# Patient Record
Sex: FEMALE | Race: WHITE
[De-identification: names, ages, dates, MRNs, and addresses within clinical notes are randomized per-mention and may not be internally consistent; named-entity substitution may affect disease eponyms.]

---

## 2018-02-04 ENCOUNTER — HOSPITAL ENCOUNTER (EMERGENCY)
Dept: HOSPITAL 45 - C.EDB | Age: 23
Discharge: HOME | End: 2018-02-04
Payer: COMMERCIAL

## 2018-02-04 VITALS
BODY MASS INDEX: 25.19 KG/M2 | WEIGHT: 177.91 LBS | BODY MASS INDEX: 25.19 KG/M2 | HEIGHT: 70.51 IN | WEIGHT: 177.91 LBS | HEIGHT: 70.51 IN

## 2018-02-04 VITALS — OXYGEN SATURATION: 96 % | SYSTOLIC BLOOD PRESSURE: 115 MMHG | DIASTOLIC BLOOD PRESSURE: 57 MMHG | HEART RATE: 67 BPM

## 2018-02-04 VITALS — TEMPERATURE: 98.24 F

## 2018-02-04 DIAGNOSIS — Z79.899: ICD-10-CM

## 2018-02-04 DIAGNOSIS — G43.909: ICD-10-CM

## 2018-02-04 DIAGNOSIS — R20.2: ICD-10-CM
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
## 2018-02-04 DIAGNOSIS — S49.92XA: Primary | ICD-10-CM
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
## 2018-02-04 DIAGNOSIS — Z80.9: ICD-10-CM

## 2018-02-04 NOTE — EMERGENCY ROOM VISIT NOTE
History


First contact with patient:  17:48


Chief Complaint:  SHOULDER PAIN


Stated Complaint:  PAIN IN L ROTATOR CUFF,SHOOTING PAIN DOWN TO HAND





History of Present Illness


The patient is a 22 year old female who presents to the Emergency Room with 

complaints of left shoulder pain with tingling radiating down her left arm to 

her hand and fingers.  The patient reports that she slipped on snow and fell 

with her left arm over the rim of a garbage can.  She reports an abrasion to 

the arm pit.  The patient reports that she had a left acromioclavicular 

separation in 2016 that did not require surgical repair.  She did undergo 

physical therapy for a while, and has not had any other significant problems 

with her injury.  The patient reports that she does have mild discomfort 

radiating into the left anterior chest.  She denies any neck pain or 

significant posterior shoulder/back pain.  The patient is right-hand-dominant, 

and rates her discomfort a 4 out of 10.





Review of Systems


10 system review was performed and was negative except for pertinent positives 

and negatives as indicated in history of present illness





Past Medical/Surgical History


Medical Problems:


(1) Concussion


(2) Migraine








Family History





FH: cancer


No pertinent family history





Social History


Smoking Status:  Never Smoker


Alcohol Use:  occasionally


Marital Status:  single


Housing Status:  lives with roommate


Occupation Status:  [employer] student





Current/Historical Medications


Scheduled


Bupropion (Wellbutrin Sr), 150 MG PO DAILY





Scheduled PRN


Tramadol (Ultram), 1-2 TAB PO Q4H PRN for Pain





Allergies


Coded Allergies:  


     Lactose. (Verified  Allergy, Intermediate, GI SYMPTOMS, 2/4/18)


     POLLEN (Verified  Allergy, Intermediate, ITCHY EYES, SNEEZING, RUNNY NOSE, 

CONGESTION, 2/4/18)





Physical Exam


Vital Signs











  Date Time  Temp Pulse Resp B/P (MAP) Pulse Ox O2 Delivery O2 Flow Rate FiO2


 


2/4/18 18:48  67 18 115/57 96   


 


2/4/18 17:44 36.8 68 16 108/65 98 Room Air  











Physical Exam


CONSTITUTIONAL:  Healthy and well nourished.  Alert and oriented X 3 with 

positive affect.


HEENT:  Normocephalic, atraumatic.  Pupils equal, round and reactive.  


NECK:  Full active range of motion without discomfort.


RESPIRATORY:  Clear to auscultation bilaterally with no wheezing, crackles, 

rhonchi or stridor.


CARDIOVASCULAR:  Regular rate and rhythm with no murmurs, rubs or gallops.


MUSCULOSKELETAL: Examination shows an abrasion in the left axilla.  Otherwise 

she has generalized mild discomfort to palpation about the anterior and 

posterior shoulder and acromioclavicular joint.  She is nontender to palpation 

through the trapezius and scapular region.  No focal tenderness through the 

bicipital groove or biceps tendons/musculature.  No focal tenderness through 

the elbow.  Range of motion of the shoulder does cause discomfort.  Distal 

pulses are intact.


INTEGUMENTARY:  No rash or other significant dermatologic conditions noted.


NEUROLOGIC:  No focal neurologic deficits noted.  Left hand and fingers, as 

well as deltoid sensation are intact.





Medical Decision & Procedures


ER Provider


Diagnostic Interpretation:


My interpretation of left shoulder x-rays does not show any acute fractures or 

dislocation.  Radiologist report is as follows:





L SHOULDER MIN 2 VIEWS ROUTINE





CLINICAL HISTORY: 22 years-old Female presenting with L shoulder pain s/p fall. 





TECHNIQUE: Internal rotation, external rotation, Grashey views of the left


shoulder were obtained. 





COMPARISON: None.





FINDINGS:


Glenohumeral and acromioclavicular joints congruent. No degenerative change. No


acute fracture or malalignment. No subluxation of the humeral head. No


radiographic soft tissue abnormality. Visualized portion of the left hemithorax


normal. Clavicle normal.





IMPRESSION:


No acute osseous injury of the left shoulder.





Medications Administered











 Medications


  (Trade)  Dose


 Ordered  Sig/Demetris


 Route  Start Time


 Stop Time Status Last Admin


Dose Admin


 


 Ibuprofen


  (Motrin Tab)  600 mg  NOW  STAT


 PO  2/4/18 18:24


 2/4/18 18:28 DC 2/4/18 18:35


600 MG


 


 Tramadol HCl


  (Ultram Home


 Pack)  1 homepack  UD  ONCE


 PO  2/4/18 18:30


 2/4/18 18:31 DC 2/4/18 18:34


1 HOMEPACK











ED Course


Patient history and physical exam were performed.  Nurse's notes were reviewed.

  Vital signs were reviewed and were normal.  The patient refused any 

analgesics on initial exam.  X-rays of the left shoulder were normal.  The 

patient was advised that she likely has an axillary plexus injury.  She was 

encouraged to intermittently apply ice to the shoulder/axillary region.  An ice 

pack and sling were also dispensed.  She was administered ibuprofen 600 mg and 

Ultram 50 mg from a home pack, and provided a prescription for Ultram.  She was 

encouraged to alternate ibuprofen and Tylenol for baseline pain relief.  She 

was encouraged to follow-up with her home orthopedic surgeon, or local 

orthopedic surgeon if symptoms are not improving within the next week.  The 

patient was happy with plan of care, and voiced understanding of all discharge 

instructions.





The patient's current medications were reviewed.  I discussed possible side 

effects with the Ultram.  The patient denies any prior history of seizures, 

therefore I feel it is safe for breakthrough treatment of pain with tramadol.  

Patient's blood pressure is also normal.





Medical Decision








[place] Drug Monitoring Program


Search Results:  patient reviewed within database, no issues identified





Impression





 Primary Impression:  


 Injury of shoulder and upper arm


 Additional Impression:  


 Paresthesia of left upper extremity





Departure Information


Prescriptions





Tramadol (Ultram) 50 Mg Tab


1-2 TAB PO Q4H Y for Pain, #20 TAB


   For Initial Treatment


   Prov: Rob Palomino PA         2/4/18





Referrals


[facility] (PCP)





Patient Instructions


My [facility]





Problem Qualifiers








 Primary Impression:  


 Injury of shoulder and upper arm


 Encounter type:  initial encounter  Laterality:  left  Qualified Codes:  

S49.92XA - Unspecified injury of left shoulder and upper arm, initial encounter

## 2018-02-04 NOTE — DIAGNOSTIC IMAGING REPORT
L SHOULDER MIN 2 VIEWS ROUTINE



CLINICAL HISTORY: 22 years-old Female presenting with L shoulder pain s/p fall. 



TECHNIQUE: Internal rotation, external rotation, Grashey views of the left

shoulder were obtained. 



COMPARISON: None.



FINDINGS:

Glenohumeral and acromioclavicular joints congruent. No degenerative change. No

acute fracture or malalignment. No subluxation of the humeral head. No

radiographic soft tissue abnormality. Visualized portion of the left hemithorax

normal. Clavicle normal.



IMPRESSION:

No acute osseous injury of the left shoulder.







Electronically signed by:  Jonnie Thomson M.D.

2/4/2018 6:04 PM



Dictated Date/Time:  2/4/2018 6:03 PM